# Patient Record
Sex: MALE | Race: OTHER | NOT HISPANIC OR LATINO | URBAN - METROPOLITAN AREA
[De-identification: names, ages, dates, MRNs, and addresses within clinical notes are randomized per-mention and may not be internally consistent; named-entity substitution may affect disease eponyms.]

---

## 2017-04-25 ENCOUNTER — EMERGENCY (EMERGENCY)
Facility: HOSPITAL | Age: 37
LOS: 1 days | Discharge: HOME | End: 2017-04-25
Attending: EMERGENCY MEDICINE | Admitting: EMERGENCY MEDICINE
Payer: MEDICAID

## 2017-04-25 VITALS
RESPIRATION RATE: 18 BRPM | DIASTOLIC BLOOD PRESSURE: 77 MMHG | OXYGEN SATURATION: 98 % | HEART RATE: 62 BPM | SYSTOLIC BLOOD PRESSURE: 132 MMHG | TEMPERATURE: 99 F
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
## 2017-04-25 DIAGNOSIS — M54.5 LOW BACK PAIN: ICD-10-CM

## 2017-04-25 DIAGNOSIS — S39.012A STRAIN OF MUSCLE, FASCIA AND TENDON OF LOWER BACK, INITIAL ENCOUNTER: ICD-10-CM
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
## 2017-04-25 PROCEDURE — 99283 EMERGENCY DEPT VISIT LOW MDM: CPT

## 2017-04-25 RX ORDER — CYCLOBENZAPRINE HYDROCHLORIDE 10 MG/1
1 TABLET, FILM COATED ORAL
Qty: 12 | Refills: 0 | OUTPATIENT
Start: 2017-04-25 | End: 2017-04-29

## 2017-04-25 NOTE — ED PROVIDER NOTE - PHYSICAL EXAMINATION
Dr. Cutler Note: no midline c/t/l spine td.  Neg straight leg either side.  Full sensation throughout.  +2 patellar reflexes bilaterally.  Has mild muscle td lumbar bilaterally without overt spasms.

## 2017-04-25 NOTE — ED PROVIDER NOTE - CARE PLAN
Instructions for follow-up, activity and diet:	1. Rest, no heavy lifting.  Warm compresses to area of pain as needed. Recommend Ortho consult to discuss possible MRI vs Physical Therapy- referral list provided.  Light walking. Take Motrin 600 mg every 8 hours for pain with food, Tylenol every 6-8 hours for pain as needed, and Flexeril 10mg every 8 hours as needed for muscle spasm- caution drowsiness/do not drive.   2. Follow with your PMD within 48-72 hours.    3. Any worsening pain, weakness, numbness, bowel or urinary incontinence, or any other concerns return to ER. Principal Discharge DX:	Back strain, initial encounter  Instructions for follow-up, activity and diet:	1. Rest, no heavy lifting.  Warm compresses to area of pain as needed. Recommend Ortho consult to discuss possible MRI vs Physical Therapy- referral list provided.  Light walking. Take Motrin 600 mg every 8 hours for pain with food, Tylenol every 6-8 hours for pain as needed, and Flexeril 10mg every 8 hours as needed for muscle spasm- caution drowsiness/do not drive.   2. Follow with your PMD within 48-72 hours.    3. Any worsening pain, weakness, numbness, bowel or urinary incontinence, or any other concerns return to ER.

## 2017-04-25 NOTE — ED PROVIDER NOTE - OBJECTIVE STATEMENT
37yo M , no PMH presenting with back pain x 2 months. Pain is bilateral lower back, radiates down legs. Reports leg warmth. Took an ibuprofen before coming to ED. Able to walk. Admits to heavy lifting at work. Denies fever/chills, numbness, tingling, bowel/bladder dysfunction, smoking. 35yo M , no PMH presenting with back pain x 2 months. Pain is bilateral lower back, radiates down legs. Reports leg warmth. Took an ibuprofen before coming to ED. Able to walk. Admits to heavy lifting at work. Denies fever/chills, numbness, tingling, bowel/bladder dysfunction, smoking.    Dr. 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

## 2017-04-25 NOTE — ED PROVIDER NOTE - PLAN OF CARE
1. Rest, no heavy lifting.  Warm compresses to area of pain as needed. Recommend Ortho consult to discuss possible MRI vs Physical Therapy- referral list provided.  Light walking. Take Motrin 600 mg every 8 hours for pain with food, Tylenol every 6-8 hours for pain as needed, and Flexeril 10mg every 8 hours as needed for muscle spasm- caution drowsiness/do not drive.   2. Follow with your PMD within 48-72 hours.    3. Any worsening pain, weakness, numbness, bowel or urinary incontinence, or any other concerns return to ER.

## 2017-04-25 NOTE — ED PROVIDER NOTE - ATTENDING CONTRIBUTION TO CARE
I, Dr. Liam Cutler, saw and examined this patient and discussed the plan of care with the PA. See my H&P notes above.